# Patient Record
Sex: FEMALE | Race: WHITE | NOT HISPANIC OR LATINO | Employment: FULL TIME | URBAN - METROPOLITAN AREA
[De-identification: names, ages, dates, MRNs, and addresses within clinical notes are randomized per-mention and may not be internally consistent; named-entity substitution may affect disease eponyms.]

---
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
## 2024-04-25 ENCOUNTER — TELEPHONE (OUTPATIENT)
Dept: OBSTETRICS AND GYNECOLOGY | Facility: CLINIC | Age: 19
End: 2024-04-25
Payer: COMMERCIAL

## 2024-04-25 NOTE — TELEPHONE ENCOUNTER
Patient is on daniela birth control she takes it continuously for 3 months but she has been bleeding the 3 weeks before she is due to start her period and she bleeds for the full 3 weeks before her period she is not sure what to do

## 2024-06-20 ENCOUNTER — APPOINTMENT (OUTPATIENT)
Dept: OBSTETRICS AND GYNECOLOGY | Facility: CLINIC | Age: 19
End: 2024-06-20
Payer: COMMERCIAL

## 2024-07-17 DIAGNOSIS — Z30.9 ENCOUNTER FOR CONTRACEPTIVE MANAGEMENT, UNSPECIFIED TYPE: Primary | ICD-10-CM

## 2024-07-17 NOTE — TELEPHONE ENCOUNTER
PT REQUESTING REFILLS ON LEVONORGEST-ETH ESTRAD     LAST ANNUAL 6/15/23   [facility]PC[facility] 11/15/24    [facility]

## 2024-07-18 RX ORDER — LEVONORGESTREL AND ETHINYL ESTRADIOL 0.15-0.03
1 KIT ORAL DAILY
Qty: 91 TABLET | Refills: 3 | Status: SHIPPED | OUTPATIENT
Start: 2024-07-18

## 2024-07-23 DIAGNOSIS — Z30.9 ENCOUNTER FOR CONTRACEPTIVE MANAGEMENT, UNSPECIFIED TYPE: ICD-10-CM

## 2024-07-23 RX ORDER — LEVONORGESTREL AND ETHINYL ESTRADIOL 0.15-0.03
1 KIT ORAL DAILY
Qty: 91 TABLET | Refills: 1 | Status: SHIPPED | OUTPATIENT
Start: 2024-07-23

## 2024-07-23 NOTE — TELEPHONE ENCOUNTER
Reviewing  EMR  Last Annual Exam: 06/15/2023  No future Annaul is scheduled - message sent to Alicia to schedule  6 month supply of medication pended for Inge Yap CNP

## 2024-07-24 ENCOUNTER — APPOINTMENT (OUTPATIENT)
Dept: PRIMARY CARE | Facility: CLINIC | Age: 19
End: 2024-07-24
Payer: COMMERCIAL

## 2024-07-24 ENCOUNTER — LAB (OUTPATIENT)
Dept: LAB | Facility: LAB | Age: 19
End: 2024-07-24
Payer: COMMERCIAL

## 2024-07-24 VITALS
WEIGHT: 134 LBS | HEART RATE: 82 BPM | DIASTOLIC BLOOD PRESSURE: 60 MMHG | TEMPERATURE: 97.8 F | OXYGEN SATURATION: 97 % | BODY MASS INDEX: 20.08 KG/M2 | SYSTOLIC BLOOD PRESSURE: 86 MMHG

## 2024-07-24 DIAGNOSIS — R53.83 OTHER FATIGUE: ICD-10-CM

## 2024-07-24 DIAGNOSIS — R53.83 OTHER FATIGUE: Primary | ICD-10-CM

## 2024-07-24 DIAGNOSIS — Z00.00 ROUTINE ADULT HEALTH MAINTENANCE: ICD-10-CM

## 2024-07-24 LAB
25(OH)D3 SERPL-MCNC: 42 NG/ML (ref 30–100)
ALBUMIN SERPL BCP-MCNC: 4.2 G/DL (ref 3.4–5)
ALP SERPL-CCNC: 39 U/L (ref 33–110)
ALT SERPL W P-5'-P-CCNC: 15 U/L (ref 7–45)
ANION GAP SERPL CALC-SCNC: 12 MMOL/L (ref 10–20)
AST SERPL W P-5'-P-CCNC: 16 U/L (ref 9–39)
BASOPHILS # BLD AUTO: 0.03 X10*3/UL (ref 0–0.1)
BASOPHILS NFR BLD AUTO: 0.4 %
BILIRUB SERPL-MCNC: 0.8 MG/DL (ref 0–1.2)
BUN SERPL-MCNC: 10 MG/DL (ref 6–23)
CALCIUM SERPL-MCNC: 9.4 MG/DL (ref 8.6–10.3)
CHLORIDE SERPL-SCNC: 106 MMOL/L (ref 98–107)
CHOLEST SERPL-MCNC: 172 MG/DL (ref 0–199)
CHOLESTEROL/HDL RATIO: 2.8
CO2 SERPL-SCNC: 26 MMOL/L (ref 21–32)
CREAT SERPL-MCNC: 0.59 MG/DL (ref 0.5–1.05)
EGFRCR SERPLBLD CKD-EPI 2021: >90 ML/MIN/1.73M*2
EOSINOPHIL # BLD AUTO: 0.08 X10*3/UL (ref 0–0.7)
EOSINOPHIL NFR BLD AUTO: 1.2 %
ERYTHROCYTE [DISTWIDTH] IN BLOOD BY AUTOMATED COUNT: 12.4 % (ref 11.5–14.5)
ERYTHROCYTE [SEDIMENTATION RATE] IN BLOOD BY WESTERGREN METHOD: 12 MM/H (ref 0–20)
GLUCOSE SERPL-MCNC: 81 MG/DL (ref 74–99)
HCT VFR BLD AUTO: 41.9 % (ref 36–46)
HDLC SERPL-MCNC: 61.1 MG/DL
HGB BLD-MCNC: 13.8 G/DL (ref 12–16)
IMM GRANULOCYTES # BLD AUTO: 0 X10*3/UL (ref 0–0.7)
IMM GRANULOCYTES NFR BLD AUTO: 0 % (ref 0–0.9)
LDLC SERPL CALC-MCNC: 96 MG/DL
LYMPHOCYTES # BLD AUTO: 2.88 X10*3/UL (ref 1.2–4.8)
LYMPHOCYTES NFR BLD AUTO: 41.6 %
MAGNESIUM SERPL-MCNC: 1.95 MG/DL (ref 1.6–2.4)
MCH RBC QN AUTO: 29.6 PG (ref 26–34)
MCHC RBC AUTO-ENTMCNC: 32.9 G/DL (ref 32–36)
MCV RBC AUTO: 90 FL (ref 80–100)
MONOCYTES # BLD AUTO: 0.35 X10*3/UL (ref 0.1–1)
MONOCYTES NFR BLD AUTO: 5.1 %
NEUTROPHILS # BLD AUTO: 3.58 X10*3/UL (ref 1.2–7.7)
NEUTROPHILS NFR BLD AUTO: 51.7 %
NON HDL CHOLESTEROL: 111 MG/DL (ref 0–119)
NRBC BLD-RTO: 0 /100 WBCS (ref 0–0)
PLATELET # BLD AUTO: 333 X10*3/UL (ref 150–450)
POTASSIUM SERPL-SCNC: 4.7 MMOL/L (ref 3.5–5.3)
PROT SERPL-MCNC: 6.8 G/DL (ref 6.4–8.2)
RBC # BLD AUTO: 4.66 X10*6/UL (ref 4–5.2)
SODIUM SERPL-SCNC: 139 MMOL/L (ref 136–145)
TRIGL SERPL-MCNC: 74 MG/DL (ref 0–149)
TSH SERPL-ACNC: 3.39 MIU/L (ref 0.44–3.98)
VIT B12 SERPL-MCNC: 274 PG/ML (ref 211–911)
VLDL: 15 MG/DL (ref 0–40)
WBC # BLD AUTO: 6.9 X10*3/UL (ref 4.4–11.3)

## 2024-07-24 PROCEDURE — 80053 COMPREHEN METABOLIC PANEL: CPT

## 2024-07-24 PROCEDURE — 83735 ASSAY OF MAGNESIUM: CPT

## 2024-07-24 PROCEDURE — 86665 EPSTEIN-BARR CAPSID VCA: CPT

## 2024-07-24 PROCEDURE — 82607 VITAMIN B-12: CPT

## 2024-07-24 PROCEDURE — 99395 PREV VISIT EST AGE 18-39: CPT | Performed by: FAMILY MEDICINE

## 2024-07-24 PROCEDURE — 85025 COMPLETE CBC W/AUTO DIFF WBC: CPT

## 2024-07-24 PROCEDURE — 36415 COLL VENOUS BLD VENIPUNCTURE: CPT

## 2024-07-24 PROCEDURE — 86664 EPSTEIN-BARR NUCLEAR ANTIGEN: CPT

## 2024-07-24 PROCEDURE — 99213 OFFICE O/P EST LOW 20 MIN: CPT | Performed by: FAMILY MEDICINE

## 2024-07-24 PROCEDURE — 86663 EPSTEIN-BARR ANTIBODY: CPT

## 2024-07-24 PROCEDURE — 85652 RBC SED RATE AUTOMATED: CPT

## 2024-07-24 PROCEDURE — 82306 VITAMIN D 25 HYDROXY: CPT

## 2024-07-24 PROCEDURE — 80061 LIPID PANEL: CPT

## 2024-07-24 PROCEDURE — 1036F TOBACCO NON-USER: CPT | Performed by: FAMILY MEDICINE

## 2024-07-24 PROCEDURE — 84443 ASSAY THYROID STIM HORMONE: CPT

## 2024-07-24 RX ORDER — SPIRONOLACTONE 100 MG/1
50 TABLET, FILM COATED ORAL
COMMUNITY
Start: 2023-12-22

## 2024-07-24 RX ORDER — TRETINOIN 0.5 MG/G
CREAM TOPICAL
COMMUNITY
Start: 2024-04-23

## 2024-07-24 ASSESSMENT — ENCOUNTER SYMPTOMS: FATIGUE: 1

## 2024-07-24 NOTE — PROGRESS NOTES
Subjective   Patient ID: Ana Cristina Harris is a 19 y.o. female who presents for Fatigue (Discuss extreme fatigue, dark circles under eyes x2 months. ) and Acne (Acne flared up x2 months. ).  Fatigue  Associated symptoms include fatigue.       Pt presents for CPE.  She's been feeling more fatigued for the past 2 months.  Losing some hair.  Getting enough sleep but feeling tired.  Mood is ok- anxiety is generally stable.  Having more acne.  No GI issues.  Had some dark circles under her eyes.  Eating healthy, exercising.  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    There is no problem list on file for this patient.      Social Connections: Not on file       Current Outpatient Medications on File Prior to Visit   Medication Sig Dispense Refill    levonorgestreL-ethinyl estrad (Seasonale) 0.15 mg-30 mcg (91) tablet TAKE 1 TABLET BY MOUTH EVERY DAY 91 tablet 1    tretinoin (Retin-A) 0.05 % cream APPLY PEA SIZE AMOUNT TO FACE 2-3 NIGHTS PER WEEK INCREASE AS TOLERATED      sertraline (Zoloft) 50 mg tablet Take 1 tablet (50 mg) by mouth once daily. 30 tablet 1    spironolactone (Aldactone) 100 mg tablet Take 0.5 tablets (50 mg) by mouth early in the morning..      [DISCONTINUED] levonorgestreL-ethinyl estrad (Seasonale) 0.15 mg-30 mcg (91) tablet Take 1 tablet by mouth once daily. 91 tablet 3    [DISCONTINUED] spironolactone (Aldactone) 50 mg tablet Take 1 tablet (50 mg) by mouth once daily.      [DISCONTINUED] tretinoin (Retin-A) 0.025 % cream Apply topically once daily at bedtime.       No current facility-administered medications on file prior to visit.        Vitals:    07/24/24 0931   BP: 86/60   Pulse: 82   Temp: 36.6 °C (97.8 °F)   SpO2: 97%     Vitals:    07/24/24 0931   Weight: 60.8 kg (134 lb)       Review of Systems   Constitutional:  Positive for fatigue.   All other systems reviewed and are negative.      Objective     Physical Exam  Vitals reviewed.   Constitutional:       General: She is not in  acute distress.     Appearance: Normal appearance. She is well-developed. She is not diaphoretic.   HENT:      Head: Normocephalic and atraumatic.      Right Ear: Tympanic membrane normal.      Left Ear: Tympanic membrane normal.      Nose: Nose normal.      Mouth/Throat:      Mouth: Mucous membranes are moist.   Eyes:      Pupils: Pupils are equal, round, and reactive to light.   Cardiovascular:      Rate and Rhythm: Normal rate and regular rhythm.      Heart sounds: Normal heart sounds. No murmur heard.     No friction rub. No gallop.   Pulmonary:      Effort: Pulmonary effort is normal.      Breath sounds: Normal breath sounds. No rales.   Abdominal:      General: Bowel sounds are normal.      Palpations: Abdomen is soft.      Tenderness: There is no abdominal tenderness.   Musculoskeletal:      Cervical back: Normal range of motion and neck supple.   Skin:     General: Skin is warm and dry.   Neurological:      Mental Status: She is alert.   Psychiatric:         Mood and Affect: Mood normal.         No visits with results within 2 Month(s) from this visit.   Latest known visit with results is:   Orders Only on 04/26/2023   Component Date Value Ref Range Status    Calprotectin, Stool 04/26/2023 CANCELED   Final-Edited    Result canceled by the ancillary.       Assessment/Plan   Problem List Items Addressed This Visit    None  Visit Diagnoses         Codes    Other fatigue    -  Primary R53.83    Relevant Orders    Lipid Panel    Comprehensive Metabolic Panel    CBC and Auto Differential    TSH with reflex to Free T4 if abnormal    Vitamin D 25-Hydroxy,Total (for eval of Vitamin D levels)    Vitamin B12    Magnesium    Sedimentation rate, automated    Jackie-Barr virus VCA antibody panel    Routine adult health maintenance     Z00.00          Checking BW.  Appears to be healthy otherwise.

## 2024-07-25 DIAGNOSIS — E03.9 HYPOTHYROIDISM, ADULT: ICD-10-CM

## 2024-07-25 DIAGNOSIS — E53.8 B12 DEFICIENCY: Primary | ICD-10-CM

## 2024-07-25 LAB
EBV EA IGG SER QL: NEGATIVE
EBV NA AB SER QL: POSITIVE
EBV VCA IGG SER IA-ACNC: POSITIVE
EBV VCA IGM SER IA-ACNC: NEGATIVE

## 2024-07-25 RX ORDER — LEVOTHYROXINE SODIUM 50 UG/1
50 TABLET ORAL DAILY
Qty: 30 TABLET | Refills: 11 | Status: SHIPPED | OUTPATIENT
Start: 2024-07-25 | End: 2025-07-25

## 2024-07-25 NOTE — RESULT ENCOUNTER NOTE
Pts BW looks ok but B12 levels are a bit low and her thyroid levels could be a bit higher too.  I'd like her to take 1000 mcg of B12 every other day.  I'm also going to send in a small dosage of thyroid meds.  Let's recheck levels in 6 mo.

## 2024-07-25 NOTE — PROGRESS NOTES
Subjective   Patient ID: Ana Cristina Harris is a 19 y.o. female who presents for No chief complaint on file..  HPI    There is no problem list on file for this patient.      Social Connections: Not on file       Current Outpatient Medications on File Prior to Visit   Medication Sig Dispense Refill    levonorgestreL-ethinyl estrad (Seasonale) 0.15 mg-30 mcg (91) tablet TAKE 1 TABLET BY MOUTH EVERY DAY 91 tablet 1    spironolactone (Aldactone) 100 mg tablet Take 0.5 tablets (50 mg) by mouth early in the morning..      tretinoin (Retin-A) 0.05 % cream APPLY PEA SIZE AMOUNT TO FACE 2-3 NIGHTS PER WEEK INCREASE AS TOLERATED      [DISCONTINUED] levonorgestreL-ethinyl estrad (Seasonale) 0.15 mg-30 mcg (91) tablet Take 1 tablet by mouth once daily. 91 tablet 3    [DISCONTINUED] sertraline (Zoloft) 50 mg tablet Take 1 tablet (50 mg) by mouth once daily. 30 tablet 1    [DISCONTINUED] spironolactone (Aldactone) 50 mg tablet Take 1 tablet (50 mg) by mouth once daily.      [DISCONTINUED] tretinoin (Retin-A) 0.025 % cream Apply topically once daily at bedtime.       No current facility-administered medications on file prior to visit.        There were no vitals filed for this visit.  There were no vitals filed for this visit.    Review of Systems    Objective     Physical Exam    Lab on 07/24/2024   Component Date Value Ref Range Status    Cholesterol 07/24/2024 172  0 - 199 mg/dL Final          Age      Desirable   Borderline High   High     0-19 Y     0 - 169       170 - 199     >/= 200    20-24 Y     0 - 189       190 - 224     >/= 225         >24 Y     0 - 199       200 - 239     >/= 240   **All ranges are based on fasting samples. Specific   therapeutic targets will vary based on patient-specific   cardiac risk.    Pediatric guidelines reference:Pediatrics 2011, 128(S5).Adult guidelines reference: NCEP ATPIII Guidelines,NINI 2001, 258:2486-97    Venipuncture immediately after or during the administration of Metamizole may lead  to falsely low results. Testing should be performed immediately prior to Metamizole dosing.    HDL-Cholesterol 07/24/2024 61.1  mg/dL Final      Age       Very Low   Low     Normal    High    0-19 Y    < 35      < 40     40-45     ----  20-24 Y    ----     < 40      >45      ----        >24 Y      ----     < 40     40-60      >60      Cholesterol/HDL Ratio 07/24/2024 2.8   Final      Ref Values  Desirable  < 3.4  High Risk  > 5.0    LDL Calculated 07/24/2024 96  <=109 mg/dL Final                                Near   Borderline      AGE      Desirable  Optimal    High     High     Very High     0-19 Y     0 - 109     ---    110-129   >/= 130     ----    20-24 Y     0 - 119     ---    120-159   >/= 160     ----      >24 Y     0 -  99   100-129  130-159   160-189     >/=190      VLDL 07/24/2024 15  0 - 40 mg/dL Final    Triglycerides 07/24/2024 74  0 - 149 mg/dL Final       Age         Desirable   Borderline High   High     Very High   0 D-90 D    19 - 174         ----         ----        ----  91 D- 9 Y     0 -  74        75 -  99     >/= 100      ----    10-19 Y     0 -  89        90 - 129     >/= 130      ----    20-24 Y     0 - 114       115 - 149     >/= 150      ----         >24 Y     0 - 149       150 - 199    200- 499    >/= 500    Venipuncture immediately after or during the administration of Metamizole may lead to falsely low results. Testing should be performed immediately prior to Metamizole dosing.    Non HDL Cholesterol 07/24/2024 111  0 - 119 mg/dL Final          Age       Desirable   Borderline High   High     Very High     0-19 Y     0 - 119       120 - 144     >/= 145    >/= 160    20-24 Y     0 - 149       150 - 189     >/= 190      ----         >24 Y    30 mg/dL above LDL Cholesterol goal      Thyroid Stimulating Hormone 07/24/2024 3.39  0.44 - 3.98 mIU/L Final    Vitamin D, 25-Hydroxy, Total 07/24/2024 42  30 - 100 ng/mL Final    Vitamin B12 07/24/2024 274  211 - 911 pg/mL Final    Magnesium  07/24/2024 1.95  1.60 - 2.40 mg/dL Final    Sedimentation Rate 07/24/2024 12  0 - 20 mm/h Final    Glucose 07/24/2024 81  74 - 99 mg/dL Final    Sodium 07/24/2024 139  136 - 145 mmol/L Final    Potassium 07/24/2024 4.7  3.5 - 5.3 mmol/L Final    Chloride 07/24/2024 106  98 - 107 mmol/L Final    Bicarbonate 07/24/2024 26  21 - 32 mmol/L Final    Anion Gap 07/24/2024 12  10 - 20 mmol/L Final    Urea Nitrogen 07/24/2024 10  6 - 23 mg/dL Final    Creatinine 07/24/2024 0.59  0.50 - 1.05 mg/dL Final    eGFR 07/24/2024 >90  >60 mL/min/1.73m*2 Final    Calculations of estimated GFR are performed using the 2021 CKD-EPI Study Refit equation without the race variable for the IDMS-Traceable creatinine methods.  https://jasn.asnjournals.org/content/early/2021/09/22/ASN.0640428448    Calcium 07/24/2024 9.4  8.6 - 10.3 mg/dL Final    Albumin 07/24/2024 4.2  3.4 - 5.0 g/dL Final    Alkaline Phosphatase 07/24/2024 39  33 - 110 U/L Final    Total Protein 07/24/2024 6.8  6.4 - 8.2 g/dL Final    AST 07/24/2024 16  9 - 39 U/L Final    Bilirubin, Total 07/24/2024 0.8  0.0 - 1.2 mg/dL Final    ALT 07/24/2024 15  7 - 45 U/L Final    Patients treated with Sulfasalazine may generate falsely decreased results for ALT.    WBC 07/24/2024 6.9  4.4 - 11.3 x10*3/uL Final    nRBC 07/24/2024 0.0  0.0 - 0.0 /100 WBCs Final    RBC 07/24/2024 4.66  4.00 - 5.20 x10*6/uL Final    Hemoglobin 07/24/2024 13.8  12.0 - 16.0 g/dL Final    Hematocrit 07/24/2024 41.9  36.0 - 46.0 % Final    MCV 07/24/2024 90  80 - 100 fL Final    MCH 07/24/2024 29.6  26.0 - 34.0 pg Final    MCHC 07/24/2024 32.9  32.0 - 36.0 g/dL Final    RDW 07/24/2024 12.4  11.5 - 14.5 % Final    Platelets 07/24/2024 333  150 - 450 x10*3/uL Final    Neutrophils % 07/24/2024 51.7  40.0 - 80.0 % Final    Immature Granulocytes %, Automated 07/24/2024 0.0  0.0 - 0.9 % Final    Immature Granulocyte Count (IG) includes promyelocytes, myelocytes and metamyelocytes but does not include bands. Percent  differential counts (%) should be interpreted in the context of the absolute cell counts (cells/UL).    Lymphocytes % 07/24/2024 41.6  13.0 - 44.0 % Final    Monocytes % 07/24/2024 5.1  2.0 - 10.0 % Final    Eosinophils % 07/24/2024 1.2  0.0 - 6.0 % Final    Basophils % 07/24/2024 0.4  0.0 - 2.0 % Final    Neutrophils Absolute 07/24/2024 3.58  1.20 - 7.70 x10*3/uL Final    Percent differential counts (%) should be interpreted in the context of the absolute cell counts (cells/uL).    Immature Granulocytes Absolute, Au* 07/24/2024 0.00  0.00 - 0.70 x10*3/uL Final    Lymphocytes Absolute 07/24/2024 2.88  1.20 - 4.80 x10*3/uL Final    Monocytes Absolute 07/24/2024 0.35  0.10 - 1.00 x10*3/uL Final    Eosinophils Absolute 07/24/2024 0.08  0.00 - 0.70 x10*3/uL Final    Basophils Absolute 07/24/2024 0.03  0.00 - 0.10 x10*3/uL Final       Assessment/Plan

## 2024-08-14 ENCOUNTER — LAB REQUISITION (OUTPATIENT)
Dept: LAB | Facility: HOSPITAL | Age: 19
End: 2024-08-14
Payer: COMMERCIAL

## 2024-08-14 DIAGNOSIS — N39.0 URINARY TRACT INFECTION, SITE NOT SPECIFIED: ICD-10-CM

## 2024-08-14 PROCEDURE — 87086 URINE CULTURE/COLONY COUNT: CPT

## 2024-08-16 LAB — BACTERIA UR CULT: NO GROWTH

## 2024-09-18 ENCOUNTER — OFFICE VISIT (OUTPATIENT)
Dept: UROLOGY | Facility: CLINIC | Age: 19
End: 2024-09-18
Payer: COMMERCIAL

## 2024-09-18 VITALS — SYSTOLIC BLOOD PRESSURE: 108 MMHG | DIASTOLIC BLOOD PRESSURE: 71 MMHG | HEART RATE: 69 BPM

## 2024-09-18 DIAGNOSIS — R39.9 LOWER URINARY TRACT SYMPTOMS (LUTS): Primary | ICD-10-CM

## 2024-09-18 DIAGNOSIS — R31.0 GROSS HEMATURIA: ICD-10-CM

## 2024-09-18 PROCEDURE — 99204 OFFICE O/P NEW MOD 45 MIN: CPT

## 2024-09-18 PROCEDURE — 51798 US URINE CAPACITY MEASURE: CPT

## 2024-09-18 PROCEDURE — 2000F BLOOD PRESSURE MEASURE: CPT

## 2024-09-18 PROCEDURE — 1036F TOBACCO NON-USER: CPT

## 2024-09-18 RX ORDER — PHENAZOPYRIDINE HYDROCHLORIDE 200 MG/1
200 TABLET, FILM COATED ORAL 3 TIMES DAILY
Qty: 180 TABLET | Refills: 2 | Status: SHIPPED | OUTPATIENT
Start: 2024-09-18 | End: 2025-03-17

## 2024-09-18 NOTE — PROGRESS NOTES
[facility]  Outpatient Clinic Note    Patient: Ana Cristina Harris  Age/Sex: 19 y.o., female  MRN: [id number]  Referred by: Dr. Dowell ref. provider found     Chief Complaint:  recurrent UTIs         History of Present Illness  This is a 19 y.o. female,  who presents as a new patient to the clinic for recurrent UTIs.  The patient has no positive urine cultures in the  system.  The patient reports symptoms of gross hematuria, dysuria, urinary urgency and frequency. She stated this has been going on for the last year. She can not connect these symptoms to food or drink. She can connect symptoms to stress while at school at [employer]. She does not feel these symptoms are related to intercourse. She denies dysuria, gross hematuria, flank pain, pelvic pain, vaginal bulging, fever or chills. The patient stated her bowel movements are normal and daily. She is sexually active, she denies pain with intercourse. She has her menses 4 times a year due to birth control. Her menses used to cause her to faint denies this was due to pain.  Denies any abdominal/pelvic surgery.             Past Medical & Surgical History  Past Medical History:   Diagnosis Date    Tachycardia, unspecified     Tachycardia     Past Surgical History:   Procedure Laterality Date    OTHER SURGICAL HISTORY  2020    Tonsillectomy       Family History  Family History   Problem Relation Name Age of Onset    Crohn's disease Mother         Social History  She reports that she has never smoked. She has never used smokeless tobacco. She reports that she does not drink alcohol and does not use drugs.    Allergies  Penicillins    Medications:  Current Outpatient Medications on File Prior to Visit   Medication Sig Dispense Refill    levonorgestreL-ethinyl estrad (Seasonale) 0.15 mg-30 mcg (91) tablet TAKE 1 TABLET BY MOUTH EVERY DAY 91 tablet 1    levothyroxine (Synthroid) 50 mcg tablet Take 1 tablet (50 mcg) by mouth early in the morning.. Take on an  empty stomach at the same time each day, either 30 to 60 minutes prior to breakfast 30 tablet 11    spironolactone (Aldactone) 100 mg tablet Take 0.5 tablets (50 mg) by mouth early in the morning..      tretinoin (Retin-A) 0.05 % cream APPLY PEA SIZE AMOUNT TO FACE 2-3 NIGHTS PER WEEK INCREASE AS TOLERATED       No current facility-administered medications on file prior to visit.      There were no vitals filed for this visit.  There is no height or weight on file to calculate BMI.    Review of Systems   A comprehensive 10+ review of systems was negative except for: see hpi          Physical Exam                                                                                                                      General: Well developed, well nourished, alert and cooperative, appears in no acute distress  Head: Normocephalic, atraumatic  Neck: supple, trachea midline  Eyes: Non-injected conjunctiva, sclera clear, no proptosis  Cardiac: Extremities are warm and well perfused. No edema, cyanosis or pallor.   Lungs: Breathing is easy, non-labored. Speaking in clear and complete sentences. Normal diaphragmatic movement.  Abdomen: soft, non-distended, non-tender, no rebound or guarding, no hernia and no CVA tenderness   MSK: Ambulatory with steady gait, unassisted  Neuro: alert and oriented to person, place and time  Psych: Demonstrates good judgement and reason, without hallucinations, abnormal affect or abnormal behaviors.  Skin: no obvious lesions, no rashes      PVR (by Ultrasound):  12mL  Urine dip: No results found for this or any previous visit (from the past 6 hour(s)).    Labs  N/A    Imaging  N/A      IMPRESSION AND PLAN:  Ana Cristina Harris is a 19 y.o. presents with LUTS and gross hematuria.      Recurrent UTI vs LUTS vs IC  -Does not meet criteria   -Urine culture each and every time you have UTI symptoms   -A standing urine culture has been placed in the  system. If you develop symptoms of UTI, please go to any   lab and drop a urine specimen. You will be contacted with results.    Gross Hematuria:  We will need CT urogram and cystoscopy    To address the patient’s hematuria, I recommended the patient follow up with diagnostic hematuria workup which includes cystoscopy, urine culture, urine cytology, and CT Urogram. Patient is aware of the risks associated with intravenous contrast. There is no history of renal dysfunction. Risks of cystoscopy were discussed with the patient in great detail, including the risk of hematuria, UTI and discomfort. Patient understands and desires to proceed.      Follow-up with Dr. Funez for your cystoscopy on 11/12    All questions and concerns were answered and addressed.  The patient expressed understanding and agrees with the plan.     Reviewed and approved by ANGEL KUMAR on 9/18/24 at 7:08 AM.

## 2024-10-16 ENCOUNTER — HOSPITAL ENCOUNTER (OUTPATIENT)
Dept: RADIOLOGY | Facility: HOSPITAL | Age: 19
Discharge: HOME | End: 2024-10-16
Payer: COMMERCIAL

## 2024-10-16 DIAGNOSIS — R31.0 GROSS HEMATURIA: ICD-10-CM

## 2024-11-12 ENCOUNTER — APPOINTMENT (OUTPATIENT)
Dept: UROLOGY | Facility: CLINIC | Age: 19
End: 2024-11-12
Payer: COMMERCIAL

## 2024-11-15 ENCOUNTER — APPOINTMENT (OUTPATIENT)
Dept: OBSTETRICS AND GYNECOLOGY | Facility: CLINIC | Age: 19
End: 2024-11-15
Payer: COMMERCIAL

## 2024-11-15 VITALS
SYSTOLIC BLOOD PRESSURE: 100 MMHG | WEIGHT: 135 LBS | HEIGHT: 68 IN | BODY MASS INDEX: 20.46 KG/M2 | DIASTOLIC BLOOD PRESSURE: 60 MMHG

## 2024-11-15 DIAGNOSIS — N89.8 VAGINAL DISCHARGE: ICD-10-CM

## 2024-11-15 DIAGNOSIS — Z01.419 ENCOUNTER FOR WELL WOMAN EXAM WITH ROUTINE GYNECOLOGICAL EXAM: Primary | ICD-10-CM

## 2024-11-15 DIAGNOSIS — Z30.9 ENCOUNTER FOR CONTRACEPTIVE MANAGEMENT, UNSPECIFIED TYPE: ICD-10-CM

## 2024-11-15 PROCEDURE — 3008F BODY MASS INDEX DOCD: CPT | Performed by: NURSE PRACTITIONER

## 2024-11-15 PROCEDURE — 99395 PREV VISIT EST AGE 18-39: CPT | Performed by: NURSE PRACTITIONER

## 2024-11-15 PROCEDURE — 87205 SMEAR GRAM STAIN: CPT

## 2024-11-15 PROCEDURE — 1036F TOBACCO NON-USER: CPT | Performed by: NURSE PRACTITIONER

## 2024-11-15 RX ORDER — LEVONORGESTREL AND ETHINYL ESTRADIOL 0.15-0.03
1 KIT ORAL DAILY
Qty: 91 TABLET | Refills: 3 | Status: SHIPPED | OUTPATIENT
Start: 2024-11-15

## 2024-11-15 RX ORDER — METRONIDAZOLE 500 MG/1
500 TABLET ORAL 2 TIMES DAILY
Qty: 14 TABLET | Refills: 0 | Status: SHIPPED | OUTPATIENT
Start: 2024-11-15 | End: 2024-11-22

## 2024-11-15 NOTE — PROGRESS NOTES
"     HPI:   Ana Cristina Harris is a 19 y.o. who presents today for her annual gynecologic exam with complaints    She has the following concerns;   Having some discharge, white/ yellow, has an odor. Denies any itching. Declines STD testing.     GYN HISTORY:  Periods are regular every 12 weeks, lasting 7 days. On an extended cycle ocp.   Dysmenorrhea:mild, occurring first 1-2 days of flow. Cyclic symptoms include none.   No intermenstrual bleeding, spotting, or discharge.    Current contraception: OCP (estrogen/progesterone)      Requests STD testing: no     PAP History   PAP due at age 20.  HPV vaccine: yes -    @paphx@    Health Screening  Family history of breast, uterine, ovarian or colon cancer: no         The patient feels safe at home.         Review of Systems:   Constitutional: no fever and no chills.  Cardiovascular: no chest pain.   Respiratory: no shortness of breath.   Gastrointestinal: no nausea, no abdominal pain and no constipation  Genitourinary: no dysuria, no urinary incontinence, no vaginal dryness, no pelvic pain and no vaginal discharge.   Neurological: no headache.  Psychiatric: no anxiety and no depression.              Objective         /60   Ht 1.72 m (5' 7.72\")   Wt 61.2 kg (135 lb)   LMP 11/01/2024   BMI 20.70 kg/m²         Physical Exam:   Constitutional: Alert and in no acute distress. Well developed, well nourished.      Neck: No neck asymmetry. Supple. Thyroid not enlarged and there were no palpable thyroid nodules.      Cardiovascular: Heart rate and rhythm were normal, normal S1 and S2, no gallops, and no murmurs.      Pulmonary: No respiratory distress. Clear bilateral breath sounds.      Chest: Breasts: Normal appearance, no nipple discharge and no skin changes. Palpation of breasts and axillae: No palpable mass and no axillary lymphadenopathy.      Abdomen: Soft nontender; no abdominal mass palpated. Normal bowel sounds. No organomegaly.      Genitourinary:   - External " genitalia: Normal.   - Palpation of lymph nodes in groin: No inguinal lymphadenopathy.   - Bartholin's Urethral and Skenes Glands: Normal.   - Urethra: Normal.    -Bladder: Normal on palpation.   - Vagina: + white/ yellow discharge.   - Cervix: Normal.   - Uterus: Normal. Right Adnexa/parametria: Normal. Left Adnexa/parametria: Normal.   - Perianal Area: Normal.      Skin: Normal skin color and pigmentation, normal skin turgor, and no rash     Psychiatric: Alert and oriented x 3. Affect normal to patient baseline. Mood: Appropriate.            Assessment/Plan       Diagnoses and all orders for this visit:  Encounter for well woman exam with routine gynecological exam  Ana Cristina is a anali patient who presents for well woman. She is doing well on her extended cycle pill. Has discharge c/w BV; will treat as such with Flagyl. PAP due next year.   Encounter for contraceptive management, unspecified type  -     levonorgestreL-ethinyl estrad (Seasonale) 0.15 mg-30 mcg (91) tablet; Take 1 tablet by mouth once daily.  Vaginal discharge  -     Vaginitis Gram Stain For Bacterial Vaginosis + Yeast  -     metroNIDAZOLE (Flagyl) 500 mg tablet; Take 1 tablet (500 mg) by mouth 2 times a day for 7 days.  Follow-up annually; sooner if needed.        JOSELITO Meeks-CNP

## 2024-11-19 LAB
CLUE CELLS VAG LPF-#/AREA: NORMAL /[LPF]
NUGENT SCORE: 2
YEAST VAG WET PREP-#/AREA: NORMAL

## 2025-05-19 ENCOUNTER — APPOINTMENT (OUTPATIENT)
Dept: OBSTETRICS AND GYNECOLOGY | Facility: CLINIC | Age: 20
End: 2025-05-19
Payer: COMMERCIAL

## 2025-05-19 VITALS
DIASTOLIC BLOOD PRESSURE: 60 MMHG | SYSTOLIC BLOOD PRESSURE: 100 MMHG | BODY MASS INDEX: 20.72 KG/M2 | WEIGHT: 135.14 LBS

## 2025-05-19 DIAGNOSIS — Z30.09 GENERAL COUNSELING AND ADVICE ON FEMALE CONTRACEPTION: Primary | ICD-10-CM

## 2025-05-19 PROCEDURE — 1036F TOBACCO NON-USER: CPT | Performed by: NURSE PRACTITIONER

## 2025-05-19 PROCEDURE — 99212 OFFICE O/P EST SF 10 MIN: CPT | Performed by: NURSE PRACTITIONER

## 2025-05-19 NOTE — PROGRESS NOTES
Subjective   Patient ID: Ana Cristina Harris is a 20 y.o. female who presents for Contraception (Reviewing  EMR/Last Annual Exam: 11/15/2024).  HPI  She would like to discuss contraception options. She is on an extended cycle pill. Would like to discuss a LARC.     Review of Systems   All other systems reviewed and are negative.      Objective   Physical Exam  Constitutional:       Appearance: Normal appearance.   Pulmonary:      Effort: Pulmonary effort is normal.   Skin:     General: Skin is warm and dry.   Neurological:      Mental Status: She is alert.   Psychiatric:         Mood and Affect: Mood normal.         Behavior: Behavior normal.         Assessment/Plan   Diagnoses and all orders for this visit:  General counseling and advice on female contraception  Disucssed birth control options including, pills, patches and rings as well as long acting reversible contraceptive like Nexplanon and IUD. She is interested in an IUD. Discussed risks, benefits, how to place. She is considering Kyleena. She will follow-up with the start of her next cycle for placement.        JOSELITO Modi-CNP 05/19/25 10:28 AM

## 2025-06-16 ENCOUNTER — APPOINTMENT (OUTPATIENT)
Dept: RADIOLOGY | Facility: HOSPITAL | Age: 20
End: 2025-06-16
Payer: COMMERCIAL

## 2025-06-16 ENCOUNTER — HOSPITAL ENCOUNTER (EMERGENCY)
Facility: HOSPITAL | Age: 20
Discharge: HOME | End: 2025-06-17
Attending: EMERGENCY MEDICINE
Payer: COMMERCIAL

## 2025-06-16 DIAGNOSIS — R10.9 FLANK PAIN: ICD-10-CM

## 2025-06-16 DIAGNOSIS — R30.0 DYSURIA: Primary | ICD-10-CM

## 2025-06-16 LAB
ALBUMIN SERPL BCP-MCNC: 4.3 G/DL (ref 3.4–5)
ALP SERPL-CCNC: 40 U/L (ref 33–110)
ALT SERPL W P-5'-P-CCNC: 15 U/L (ref 7–45)
ANION GAP SERPL CALC-SCNC: 12 MMOL/L (ref 10–20)
APPEARANCE UR: CLEAR
AST SERPL W P-5'-P-CCNC: 18 U/L (ref 9–39)
BACTERIA #/AREA URNS AUTO: ABNORMAL /HPF
BASOPHILS # BLD AUTO: 0.04 X10*3/UL (ref 0–0.1)
BASOPHILS NFR BLD AUTO: 0.4 %
BILIRUB SERPL-MCNC: 0.5 MG/DL (ref 0–1.2)
BILIRUB UR STRIP.AUTO-MCNC: NEGATIVE MG/DL
BUN SERPL-MCNC: 8 MG/DL (ref 6–23)
CALCIUM SERPL-MCNC: 9.1 MG/DL (ref 8.6–10.3)
CHLORIDE SERPL-SCNC: 105 MMOL/L (ref 98–107)
CO2 SERPL-SCNC: 24 MMOL/L (ref 21–32)
COLOR UR: ABNORMAL
CREAT SERPL-MCNC: 0.52 MG/DL (ref 0.5–1.05)
EGFRCR SERPLBLD CKD-EPI 2021: >90 ML/MIN/1.73M*2
EOSINOPHIL # BLD AUTO: 0.76 X10*3/UL (ref 0–0.7)
EOSINOPHIL NFR BLD AUTO: 8.4 %
ERYTHROCYTE [DISTWIDTH] IN BLOOD BY AUTOMATED COUNT: 12.1 % (ref 11.5–14.5)
GLUCOSE SERPL-MCNC: 78 MG/DL (ref 74–99)
GLUCOSE UR STRIP.AUTO-MCNC: NORMAL MG/DL
HCG UR QL IA.RAPID: NEGATIVE
HCT VFR BLD AUTO: 39.9 % (ref 36–46)
HGB BLD-MCNC: 13.5 G/DL (ref 12–16)
IMM GRANULOCYTES # BLD AUTO: 0.01 X10*3/UL (ref 0–0.7)
IMM GRANULOCYTES NFR BLD AUTO: 0.1 % (ref 0–0.9)
KETONES UR STRIP.AUTO-MCNC: NEGATIVE MG/DL
LEUKOCYTE ESTERASE UR QL STRIP.AUTO: ABNORMAL
LYMPHOCYTES # BLD AUTO: 4.09 X10*3/UL (ref 1.2–4.8)
LYMPHOCYTES NFR BLD AUTO: 45.1 %
MCH RBC QN AUTO: 29.3 PG (ref 26–34)
MCHC RBC AUTO-ENTMCNC: 33.8 G/DL (ref 32–36)
MCV RBC AUTO: 87 FL (ref 80–100)
MONOCYTES # BLD AUTO: 0.61 X10*3/UL (ref 0.1–1)
MONOCYTES NFR BLD AUTO: 6.7 %
NEUTROPHILS # BLD AUTO: 3.55 X10*3/UL (ref 1.2–7.7)
NEUTROPHILS NFR BLD AUTO: 39.3 %
NITRITE UR QL STRIP.AUTO: NEGATIVE
NRBC BLD-RTO: 0 /100 WBCS (ref 0–0)
PH UR STRIP.AUTO: 6 [PH]
PLATELET # BLD AUTO: 323 X10*3/UL (ref 150–450)
POTASSIUM SERPL-SCNC: 3.7 MMOL/L (ref 3.5–5.3)
PROT SERPL-MCNC: 7.2 G/DL (ref 6.4–8.2)
PROT UR STRIP.AUTO-MCNC: NEGATIVE MG/DL
RBC # BLD AUTO: 4.6 X10*6/UL (ref 4–5.2)
RBC # UR STRIP.AUTO: NEGATIVE MG/DL
RBC #/AREA URNS AUTO: ABNORMAL /HPF
SODIUM SERPL-SCNC: 137 MMOL/L (ref 136–145)
SP GR UR STRIP.AUTO: 1
SQUAMOUS #/AREA URNS AUTO: ABNORMAL /HPF
UROBILINOGEN UR STRIP.AUTO-MCNC: NORMAL MG/DL
WBC # BLD AUTO: 9.1 X10*3/UL (ref 4.4–11.3)
WBC #/AREA URNS AUTO: ABNORMAL /HPF

## 2025-06-16 PROCEDURE — 81025 URINE PREGNANCY TEST: CPT | Performed by: EMERGENCY MEDICINE

## 2025-06-16 PROCEDURE — 96361 HYDRATE IV INFUSION ADD-ON: CPT

## 2025-06-16 PROCEDURE — 36415 COLL VENOUS BLD VENIPUNCTURE: CPT | Performed by: EMERGENCY MEDICINE

## 2025-06-16 PROCEDURE — 87086 URINE CULTURE/COLONY COUNT: CPT | Mod: PORLAB | Performed by: EMERGENCY MEDICINE

## 2025-06-16 PROCEDURE — 74176 CT ABD & PELVIS W/O CONTRAST: CPT

## 2025-06-16 PROCEDURE — 96375 TX/PRO/DX INJ NEW DRUG ADDON: CPT

## 2025-06-16 PROCEDURE — 81001 URINALYSIS AUTO W/SCOPE: CPT | Performed by: EMERGENCY MEDICINE

## 2025-06-16 PROCEDURE — 74176 CT ABD & PELVIS W/O CONTRAST: CPT | Performed by: RADIOLOGY

## 2025-06-16 PROCEDURE — 96374 THER/PROPH/DIAG INJ IV PUSH: CPT

## 2025-06-16 PROCEDURE — 80053 COMPREHEN METABOLIC PANEL: CPT | Performed by: EMERGENCY MEDICINE

## 2025-06-16 PROCEDURE — 2500000004 HC RX 250 GENERAL PHARMACY W/ HCPCS (ALT 636 FOR OP/ED): Performed by: EMERGENCY MEDICINE

## 2025-06-16 PROCEDURE — 85025 COMPLETE CBC W/AUTO DIFF WBC: CPT | Performed by: EMERGENCY MEDICINE

## 2025-06-16 PROCEDURE — 99284 EMERGENCY DEPT VISIT MOD MDM: CPT | Mod: 25 | Performed by: EMERGENCY MEDICINE

## 2025-06-16 RX ORDER — KETOROLAC TROMETHAMINE 30 MG/ML
15 INJECTION, SOLUTION INTRAMUSCULAR; INTRAVENOUS ONCE
Status: COMPLETED | OUTPATIENT
Start: 2025-06-16 | End: 2025-06-16

## 2025-06-16 RX ORDER — ONDANSETRON HYDROCHLORIDE 2 MG/ML
4 INJECTION, SOLUTION INTRAVENOUS ONCE
Status: COMPLETED | OUTPATIENT
Start: 2025-06-16 | End: 2025-06-16

## 2025-06-16 RX ADMIN — KETOROLAC TROMETHAMINE 15 MG: 30 INJECTION, SOLUTION INTRAMUSCULAR at 22:55

## 2025-06-16 RX ADMIN — ONDANSETRON 4 MG: 2 INJECTION, SOLUTION INTRAMUSCULAR; INTRAVENOUS at 22:55

## 2025-06-16 RX ADMIN — SODIUM CHLORIDE 1000 ML: 0.9 INJECTION, SOLUTION INTRAVENOUS at 22:54

## 2025-06-16 ASSESSMENT — PAIN DESCRIPTION - PAIN TYPE: TYPE: ACUTE PAIN

## 2025-06-16 ASSESSMENT — PAIN DESCRIPTION - ORIENTATION
ORIENTATION: LEFT;LOWER
ORIENTATION_2: RIGHT;LOWER

## 2025-06-16 ASSESSMENT — PAIN SCALES - GENERAL
PAINLEVEL_OUTOF10: 6
PAINLEVEL_OUTOF10: 4

## 2025-06-16 ASSESSMENT — PAIN DESCRIPTION - DESCRIPTORS
DESCRIPTORS: TIGHTNESS
DESCRIPTORS_2: TIGHTNESS

## 2025-06-16 ASSESSMENT — PAIN - FUNCTIONAL ASSESSMENT: PAIN_FUNCTIONAL_ASSESSMENT: 0-10

## 2025-06-16 ASSESSMENT — PAIN DESCRIPTION - FREQUENCY: FREQUENCY: CONSTANT/CONTINUOUS

## 2025-06-16 ASSESSMENT — PAIN DESCRIPTION - LOCATION
LOCATION: BACK
LOCATION_2: BACK

## 2025-06-16 NOTE — Clinical Note
Ana Cristina Harris was seen and treated in our emergency department on 6/16/2025.  She may return to work on 06/18/2025.       If you have any questions or concerns, please don't hesitate to call.      Crystal Linares MD

## 2025-06-17 VITALS
SYSTOLIC BLOOD PRESSURE: 119 MMHG | BODY MASS INDEX: 21.19 KG/M2 | HEART RATE: 68 BPM | HEIGHT: 67 IN | DIASTOLIC BLOOD PRESSURE: 80 MMHG | WEIGHT: 135 LBS | TEMPERATURE: 99.1 F | OXYGEN SATURATION: 99 % | RESPIRATION RATE: 16 BRPM

## 2025-06-17 LAB — HOLD SPECIMEN: 293

## 2025-06-17 PROCEDURE — 2500000002 HC RX 250 W HCPCS SELF ADMINISTERED DRUGS (ALT 637 FOR MEDICARE OP, ALT 636 FOR OP/ED): Performed by: EMERGENCY MEDICINE

## 2025-06-17 RX ORDER — SULFAMETHOXAZOLE AND TRIMETHOPRIM 800; 160 MG/1; MG/1
1 TABLET ORAL 2 TIMES DAILY
Qty: 20 TABLET | Refills: 0 | Status: SHIPPED | OUTPATIENT
Start: 2025-06-17 | End: 2025-06-27

## 2025-06-17 RX ORDER — SULFAMETHOXAZOLE AND TRIMETHOPRIM 800; 160 MG/1; MG/1
1 TABLET ORAL ONCE
Status: COMPLETED | OUTPATIENT
Start: 2025-06-17 | End: 2025-06-17

## 2025-06-17 RX ORDER — PHENAZOPYRIDINE HYDROCHLORIDE 200 MG/1
200 TABLET, FILM COATED ORAL 3 TIMES DAILY
Qty: 6 TABLET | Refills: 0 | Status: SHIPPED | OUTPATIENT
Start: 2025-06-17 | End: 2025-06-19

## 2025-06-17 RX ADMIN — SULFAMETHOXAZOLE AND TRIMETHOPRIM 1 TABLET: 800; 160 TABLET ORAL at 00:21

## 2025-06-17 NOTE — ED PROVIDER NOTES
HPI   Chief Complaint   Patient presents with    kidney stone      Pt states has had urinary symptoms last 3-4 days  yesterday increased back pain both flanks  today had some trouble passing urine then when did states she passed a stone (no hx)   still having pain and feels weak        Patient presents the emergency department secondary to concern for malaise and urinary frequency over the last 3 to 4 days.  Yesterday she went to try to go to the bathroom and she states it took about 25 minutes for her to urinate.  When she finally was able to urinate she passed what seemed like a small kidney stone.  She used all of dark spot in the toilet and heard a click when it fell.  She has never had kidney stones before.  She continues to have urinary frequency and right flank pain.  She has had chills but no documented fever.  No nausea or vomiting.  No change in bowel movements.  Patient denies pregnancy.  She currently is on birth control.  She has not missed any pills recently.  She is sexually active.  No vaginal discharge or complaints.                          [place] Coma Scale Score: 15                  Patient History   Medical History[1]  Surgical History[2]  Family History[3]  Social History[4]    Physical Exam   ED Triage Vitals [06/16/25 2126]   Temperature Heart Rate Respirations BP   37.3 °C (99.1 °F) 68 18 150/83      Pulse Ox Temp Source Heart Rate Source Patient Position   99 % Tympanic Monitor Sitting      BP Location FiO2 (%)     Left arm --       Physical Exam  Vitals and nursing note reviewed.   Constitutional:       Appearance: Normal appearance.   HENT:      Head: Normocephalic.      Nose: Nose normal.      Mouth/Throat:      Mouth: Mucous membranes are moist.   Eyes:      Pupils: Pupils are equal, round, and reactive to light.   Cardiovascular:      Rate and Rhythm: Normal rate and regular rhythm.      Pulses: Normal pulses.      Heart sounds: Normal heart sounds.   Pulmonary:      Effort: Pulmonary  effort is normal.      Breath sounds: Normal breath sounds. No wheezing, rhonchi or rales.   Abdominal:      General: Abdomen is flat. Bowel sounds are normal.      Palpations: Abdomen is soft.      Tenderness: There is no abdominal tenderness. There is right CVA tenderness. There is no guarding or rebound.   Musculoskeletal:         General: Normal range of motion.      Cervical back: Normal range of motion.      Right lower leg: No edema.      Left lower leg: No edema.   Skin:     General: Skin is warm and dry.      Capillary Refill: Capillary refill takes less than 2 seconds.   Neurological:      General: No focal deficit present.      Mental Status: She is alert and oriented to person, place, and time.   Psychiatric:         Mood and Affect: Mood normal.         Behavior: Behavior normal.       Labs Reviewed - No data to display  Pain Management Panel          Latest Ref Rng & Units 9/7/2023   Pain Management Panel   Amphetamine Screen, Urine NEGATIVE PRESUMPTIVE NEGATIVE    Barbiturate Screen, Urine NEGATIVE PRESUMPTIVE NEGATIVE    Fentanyl Screen, Urine NEGATIVE PRESUMPTIVE NEGATIVE      No orders to display     ED Course & MDM   Diagnoses as of 06/17/25 0012   Dysuria   Flank pain       Medical Decision Making  Patient presents to the emergency department secondary to flank discomfort and urinary frequency.  Patient was evaluated in the emergency department for urinary tract infection, pyelonephritis, ureterolithiasis, urinary obstruction or other acute cause.  Laboratory workup including CBC CMP and urinalysis are obtained.  Urine hCG is obtained.  Patient is medicated with 15 mg of IV Toradol and 4 mg of Zofran for pain and nausea.  She is given 1 L normal saline IV fluid bolus.  Chemistry shows no acute abnormality.  Urinalysis shows 25 leukocyte Estrace 1-5 white cells 1-2 red cells 1-9 squamous epithelials and 1+ bacteria.  hCG is negative.  CBC shows no evidence of leukocytosis or acute anemia.  CT  abdomen pelvis without contrast is obtained.  CT scan shows no evidence of acute intra-abdominal process.  With flank pain and positive bacteria in the urine patient will be placed on antibiotics.  She was given Bactrim first dose in the department.  She is placed on 10 days of Bactrim because of flank pain.  Patient will follow-up with primary care or return here for any worsening symptoms.  She is stable for discharge at this time.        Procedure  Procedures         [1]   Past Medical History:  Diagnosis Date    Tachycardia, unspecified     Tachycardia   [2]   Past Surgical History:  Procedure Laterality Date    TONSILLECTOMY     [3]   Family History  Problem Relation Name Age of Onset    Crohn's disease Mother      No Known Problems Father     [4]   Social History  Tobacco Use    Smoking status: Never    Smokeless tobacco: Never   Vaping Use    Vaping status: Never Used   Substance Use Topics    Alcohol use: Yes    Drug use: Never        Crystal Linares MD  06/17/25 0013

## 2025-06-18 ENCOUNTER — PATIENT OUTREACH (OUTPATIENT)
Dept: CARE COORDINATION | Facility: CLINIC | Age: 20
End: 2025-06-18
Payer: COMMERCIAL

## 2025-06-18 LAB — BACTERIA UR CULT: NO GROWTH

## 2025-06-18 NOTE — PROGRESS NOTES
Attempt made to reach patient for transitions of care outreach and no contact made with patient. Left voicemail with my name and contact information.     Patient to ED with complaints of -  Clinical Impressions      Dysuria    Flank pain        Patient was discharged on an antibiotic.      Alysha Arias , RN   Nurse Care Manager   [facility] Department   [phone]

## 2025-07-15 ENCOUNTER — TELEPHONE (OUTPATIENT)
Dept: OBSTETRICS AND GYNECOLOGY | Facility: CLINIC | Age: 20
End: 2025-07-15
Payer: COMMERCIAL

## 2025-07-15 NOTE — TELEPHONE ENCOUNTER
Please have her use condoms as backup pregnancy prevention. She can get her IUD placed at any time. She should come in with the start of a period. She does not have to wait until her Dec. Appointment.

## 2025-07-15 NOTE — TELEPHONE ENCOUNTER
Spoke to patient - Patient states she is scheduled for an annual 12/05/2025. She is interested in iud. Patient feels she may discontinue her ocp to give her body a break. Advised if sexually active to use another form of pregnancy prevention.    Patient informed she may come in sooner for iud insertion. Advised to call office at start of menses to schedule.

## 2025-07-15 NOTE — TELEPHONE ENCOUNTER
Patient has interest in an IUD insertion but would like your guidance first. She is wondering if it would be best for her to take a break from birth control before moving forward with an IUD and what your thoughts are on this.

## 2025-08-08 ENCOUNTER — APPOINTMENT (OUTPATIENT)
Dept: OBSTETRICS AND GYNECOLOGY | Facility: CLINIC | Age: 20
End: 2025-08-08
Payer: COMMERCIAL

## 2025-08-08 VITALS — SYSTOLIC BLOOD PRESSURE: 100 MMHG | WEIGHT: 141 LBS | DIASTOLIC BLOOD PRESSURE: 70 MMHG | BODY MASS INDEX: 22.08 KG/M2

## 2025-08-08 DIAGNOSIS — Z32.02 URINE PREGNANCY TEST NEGATIVE: ICD-10-CM

## 2025-08-08 DIAGNOSIS — Z11.3 SCREENING FOR STD (SEXUALLY TRANSMITTED DISEASE): ICD-10-CM

## 2025-08-08 DIAGNOSIS — Z30.430 ENCOUNTER FOR IUD INSERTION: Primary | ICD-10-CM

## 2025-08-08 LAB — PREGNANCY TEST URINE, POC: NEGATIVE

## 2025-08-08 PROCEDURE — 81025 URINE PREGNANCY TEST: CPT | Performed by: NURSE PRACTITIONER

## 2025-08-08 PROCEDURE — 58300 INSERT INTRAUTERINE DEVICE: CPT | Performed by: NURSE PRACTITIONER

## 2025-08-08 RX ORDER — LEVONORGESTREL 19.5 MG/1
1 INTRAUTERINE DEVICE INTRAUTERINE ONCE
COMMUNITY

## 2025-08-08 NOTE — PROGRESS NOTES
Subjective   Patient ID: Ana Cristina Harris is a 20 y.o. female who presents for Insertion of an intrauterine device (Reviewing  EMR/Last Annual Exam: 11/15/2024/).  HPI  Here for kyleena insertion. Consent obtained. Reviewed risks, benefits, how to place.     Review of Systems   All other systems reviewed and are negative.      Objective   Physical Exam  Constitutional:       Appearance: Normal appearance.     Cardiovascular:      Pulses: Normal pulses.   Genitourinary:     General: Normal vulva.      Vagina: Normal.      Cervix: Normal.      Uterus: Normal.      Skin:     General: Skin is warm and dry.     Neurological:      Mental Status: She is alert.     Psychiatric:         Mood and Affect: Mood normal.         Behavior: Behavior normal.     Patient ID: AnaC ristina Harris is a 20 y.o. female.    IUD Management    Performed by: JANNA Modi  Authorized by: JANNA Modi    Procedure: IUD insertion    Consent obtained by patient, parent, or legal power of  - including discussion of procedure risks and benefits, patient questions answered, and patient education provided: yes    Pregnancy risk: reasonably certain the patient is not pregnant    Date/Time of Insertion:  8/8/2025 3:34 PM  Immediately prior to procedure a time out was called: yes    Pelvic exam performed: yes    Speculum placed in vagina: yes    Cervix cleaned and prepped: yes    Tenaculum/Allis/Ring Forceps applied to cervix: yes    Anesthesia used: yes    Local anesthesia:  Topical  Local anesthetic:  Lidocaine  Uterus sound depth (cm):  7.5  Cervix dilated: yes    Cervix dilated with:  Cervical os finder  IUD inserted without complications: yes    OSM: levonorgestreL 17.5 mcg/24 hr (5 yrs) 19.5 mg  Strings trimmed to (cm):  3  Patient tolerated procedure well: yes    Estimated blood loss (mL):  0  Intended removal date: 5 years        Assessment/Plan   Diagnoses and all orders for this visit:  Encounter for IUD  insertion  Per procedure note.   Screening for STD (sexually transmitted disease)  -     C. trachomatis / N. gonorrhoeae, Amplified, Urogenital  Urine pregnancy test negative  -     POCT pregnancy, urine manually resulted  Other orders  -     IUD Management  Follow-up in 4-6 weeks for string check. Sooner if needed.          JOSELITO Modi-CNP 08/08/25 3:33 PM

## 2025-08-09 LAB
C TRACH RRNA SPEC QL NAA+PROBE: NOT DETECTED
N GONORRHOEA RRNA SPEC QL NAA+PROBE: NOT DETECTED
QUEST GC CT AMPLIFIED (ALWAYS MESSAGE): NORMAL

## 2025-09-26 ENCOUNTER — APPOINTMENT (OUTPATIENT)
Dept: OBSTETRICS AND GYNECOLOGY | Facility: CLINIC | Age: 20
End: 2025-09-26
Payer: COMMERCIAL

## 2025-12-05 ENCOUNTER — APPOINTMENT (OUTPATIENT)
Dept: OBSTETRICS AND GYNECOLOGY | Facility: CLINIC | Age: 20
End: 2025-12-05
Payer: COMMERCIAL